# Patient Record
Sex: FEMALE | Race: WHITE | NOT HISPANIC OR LATINO | Employment: OTHER | URBAN - METROPOLITAN AREA
[De-identification: names, ages, dates, MRNs, and addresses within clinical notes are randomized per-mention and may not be internally consistent; named-entity substitution may affect disease eponyms.]

---

## 2023-01-01 ENCOUNTER — HOSPITAL ENCOUNTER (OUTPATIENT)
Dept: DATA CONVERSION | Facility: HOSPITAL | Age: 68
Discharge: HOME | End: 2023-08-08

## 2023-01-01 ENCOUNTER — TELEPHONE (OUTPATIENT)
Dept: PRIMARY CARE | Facility: CLINIC | Age: 68
End: 2023-01-01
Payer: MEDICARE

## 2023-01-01 ENCOUNTER — TELEPHONE (OUTPATIENT)
Dept: PAIN MEDICINE | Facility: CLINIC | Age: 68
End: 2023-01-01

## 2023-01-01 ENCOUNTER — OFFICE VISIT (OUTPATIENT)
Dept: PAIN MEDICINE | Facility: CLINIC | Age: 68
End: 2023-01-01
Payer: MEDICARE

## 2023-01-01 VITALS
OXYGEN SATURATION: 97 % | WEIGHT: 148 LBS | TEMPERATURE: 96.4 F | HEIGHT: 63 IN | SYSTOLIC BLOOD PRESSURE: 130 MMHG | BODY MASS INDEX: 26.22 KG/M2 | DIASTOLIC BLOOD PRESSURE: 72 MMHG | HEART RATE: 64 BPM | RESPIRATION RATE: 16 BRPM

## 2023-01-01 VITALS
BODY MASS INDEX: 22.66 KG/M2 | HEART RATE: 76 BPM | HEIGHT: 66 IN | SYSTOLIC BLOOD PRESSURE: 127 MMHG | RESPIRATION RATE: 16 BRPM | WEIGHT: 141 LBS | DIASTOLIC BLOOD PRESSURE: 78 MMHG

## 2023-01-01 DIAGNOSIS — G47.00 INSOMNIA, UNSPECIFIED TYPE: ICD-10-CM

## 2023-01-01 DIAGNOSIS — M47.812 CERVICAL SPONDYLOSIS: ICD-10-CM

## 2023-01-01 DIAGNOSIS — M79.7 FIBROMYALGIA: Primary | ICD-10-CM

## 2023-01-01 DIAGNOSIS — G47.00 INSOMNIA, UNSPECIFIED TYPE: Primary | ICD-10-CM

## 2023-01-01 DIAGNOSIS — K58.9 IRRITABLE BOWEL SYNDROME, UNSPECIFIED TYPE: Primary | ICD-10-CM

## 2023-01-01 DIAGNOSIS — D48.5 NEOPLASM OF UNCERTAIN BEHAVIOR OF SKIN: ICD-10-CM

## 2023-01-01 PROCEDURE — 1036F TOBACCO NON-USER: CPT | Performed by: ANESTHESIOLOGY

## 2023-01-01 PROCEDURE — 99214 OFFICE O/P EST MOD 30 MIN: CPT | Performed by: PHYSICAL MEDICINE & REHABILITATION

## 2023-01-01 PROCEDURE — 99204 OFFICE O/P NEW MOD 45 MIN: CPT | Performed by: ANESTHESIOLOGY

## 2023-01-01 PROCEDURE — 3078F DIAST BP <80 MM HG: CPT | Performed by: PHYSICAL MEDICINE & REHABILITATION

## 2023-01-01 PROCEDURE — 3078F DIAST BP <80 MM HG: CPT | Performed by: ANESTHESIOLOGY

## 2023-01-01 PROCEDURE — 1160F RVW MEDS BY RX/DR IN RCRD: CPT | Performed by: ANESTHESIOLOGY

## 2023-01-01 PROCEDURE — 1159F MED LIST DOCD IN RCRD: CPT | Performed by: ANESTHESIOLOGY

## 2023-01-01 PROCEDURE — 3074F SYST BP LT 130 MM HG: CPT | Performed by: PHYSICAL MEDICINE & REHABILITATION

## 2023-01-01 PROCEDURE — 1125F AMNT PAIN NOTED PAIN PRSNT: CPT | Performed by: ANESTHESIOLOGY

## 2023-01-01 PROCEDURE — 1125F AMNT PAIN NOTED PAIN PRSNT: CPT | Performed by: PHYSICAL MEDICINE & REHABILITATION

## 2023-01-01 PROCEDURE — 1160F RVW MEDS BY RX/DR IN RCRD: CPT | Performed by: PHYSICAL MEDICINE & REHABILITATION

## 2023-01-01 PROCEDURE — 99214 OFFICE O/P EST MOD 30 MIN: CPT | Performed by: ANESTHESIOLOGY

## 2023-01-01 PROCEDURE — 3075F SYST BP GE 130 - 139MM HG: CPT | Performed by: ANESTHESIOLOGY

## 2023-01-01 PROCEDURE — 1159F MED LIST DOCD IN RCRD: CPT | Performed by: PHYSICAL MEDICINE & REHABILITATION

## 2023-01-01 RX ORDER — AMOXICILLIN AND CLAVULANATE POTASSIUM 875; 125 MG/1; MG/1
875 TABLET, FILM COATED ORAL EVERY 12 HOURS
COMMUNITY
Start: 2020-03-11 | End: 2023-01-01 | Stop reason: ALTCHOICE

## 2023-01-01 RX ORDER — PERPHENAZINE 16 MG
TABLET ORAL
Qty: 120 CAPSULE | Refills: 11 | Status: SHIPPED | OUTPATIENT
Start: 2023-01-01 | End: 2023-12-05

## 2023-01-01 RX ORDER — DEXLANSOPRAZOLE 60 MG/1
60 CAPSULE, DELAYED RELEASE ORAL
COMMUNITY
Start: 2023-01-01

## 2023-01-01 RX ORDER — ZOLPIDEM TARTRATE 12.5 MG/1
12.5 TABLET, FILM COATED, EXTENDED RELEASE ORAL NIGHTLY PRN
Qty: 10 TABLET | Refills: 0 | Status: SHIPPED | OUTPATIENT
Start: 2023-01-01 | End: 2023-01-01 | Stop reason: SDUPTHER

## 2023-01-01 RX ORDER — ISOSORBIDE MONONITRATE 20 MG/1
TABLET ORAL
COMMUNITY
End: 2023-01-01 | Stop reason: ALTCHOICE

## 2023-01-01 RX ORDER — ATORVASTATIN CALCIUM 40 MG/1
40 TABLET, FILM COATED ORAL DAILY
COMMUNITY

## 2023-01-01 RX ORDER — PREDNISONE 10 MG/1
10 TABLET ORAL DAILY
COMMUNITY
Start: 2023-01-01 | End: 2023-01-01 | Stop reason: ALTCHOICE

## 2023-01-01 RX ORDER — ZOLPIDEM TARTRATE 5 MG/1
5 TABLET ORAL NIGHTLY PRN
COMMUNITY
End: 2023-01-01 | Stop reason: DRUGHIGH

## 2023-01-01 RX ORDER — PROMETHAZINE HYDROCHLORIDE 25 MG/1
25 TABLET ORAL EVERY 8 HOURS PRN
COMMUNITY

## 2023-01-01 RX ORDER — ZOLPIDEM TARTRATE 12.5 MG/1
12.5 TABLET, FILM COATED, EXTENDED RELEASE ORAL NIGHTLY PRN
Qty: 30 TABLET | Refills: 0 | Status: SHIPPED | OUTPATIENT
Start: 2023-01-01 | End: 2023-12-05

## 2023-01-01 RX ORDER — BUSPIRONE HYDROCHLORIDE 7.5 MG/1
7.5 TABLET ORAL 2 TIMES DAILY
COMMUNITY

## 2023-01-01 RX ORDER — DIPHENHYDRAMINE HCL 25 MG
25 TABLET ORAL NIGHTLY PRN
COMMUNITY

## 2023-01-01 RX ORDER — ACETAMINOPHEN 160 MG/5ML
200 SUSPENSION, ORAL (FINAL DOSE FORM) ORAL 3 TIMES DAILY
Qty: 90 CAPSULE | Refills: 11 | Status: SHIPPED | OUTPATIENT
Start: 2023-01-01 | End: 2023-12-05

## 2023-01-01 RX ORDER — VITAMIN B COMPLEX
1 CAPSULE ORAL 2 TIMES DAILY
Qty: 60 CAPSULE | Refills: 11 | Status: SHIPPED | OUTPATIENT
Start: 2023-01-01 | End: 2023-12-05

## 2023-01-01 RX ORDER — CALCIUM CARBONATE 300MG(750)
TABLET,CHEWABLE ORAL
COMMUNITY

## 2023-01-01 RX ORDER — HYDROXYZINE HYDROCHLORIDE 25 MG/1
4 TABLET, FILM COATED ORAL NIGHTLY
COMMUNITY
Start: 2018-03-21

## 2023-01-01 RX ORDER — PANTOPRAZOLE SODIUM 40 MG/1
40 TABLET, DELAYED RELEASE ORAL EVERY 12 HOURS
COMMUNITY

## 2023-01-01 RX ORDER — TIZANIDINE 2 MG/1
2 TABLET ORAL
COMMUNITY
Start: 2023-01-01 | End: 2023-01-01 | Stop reason: ALTCHOICE

## 2023-01-01 RX ORDER — ZOLPIDEM TARTRATE 12.5 MG/1
12.5 TABLET, FILM COATED, EXTENDED RELEASE ORAL EVERY 24 HOURS
COMMUNITY
Start: 2023-01-01 | End: 2023-01-01

## 2023-01-01 RX ORDER — LANOLIN ALCOHOL/MO/W.PET/CERES
400 CREAM (GRAM) TOPICAL 2 TIMES DAILY
COMMUNITY
End: 2023-01-01 | Stop reason: WASHOUT

## 2023-01-01 RX ORDER — FOLIC ACID 1 MG/1
1 TABLET ORAL DAILY
COMMUNITY

## 2023-01-01 RX ORDER — BUTALBITAL, ACETAMINOPHEN AND CAFFEINE 50; 325; 40 MG/1; MG/1; MG/1
1 CAPSULE ORAL EVERY 4 HOURS PRN
COMMUNITY
Start: 2023-01-01

## 2023-01-01 RX ORDER — TIZANIDINE 4 MG/1
4 TABLET ORAL EVERY 8 HOURS PRN
COMMUNITY

## 2023-01-01 RX ORDER — FAMOTIDINE 40 MG/1
TABLET, FILM COATED ORAL
COMMUNITY
End: 2023-01-01 | Stop reason: ALTCHOICE

## 2023-01-01 RX ORDER — LOPERAMIDE HYDROCHLORIDE 2 MG/1
2 CAPSULE ORAL
COMMUNITY
End: 2023-01-01 | Stop reason: WASHOUT

## 2023-01-01 RX ORDER — ZOLPIDEM TARTRATE 10 MG/1
10 TABLET ORAL
COMMUNITY
Start: 2022-01-01 | End: 2023-01-01 | Stop reason: DRUGHIGH

## 2023-01-01 RX ORDER — GABAPENTIN 600 MG/1
600 TABLET ORAL 3 TIMES DAILY
COMMUNITY

## 2023-01-01 RX ORDER — CIPROFLOXACIN 500 MG/1
500 TABLET ORAL EVERY 12 HOURS
COMMUNITY
Start: 2023-01-01 | End: 2023-01-01 | Stop reason: ALTCHOICE

## 2023-01-01 RX ORDER — MONTELUKAST SODIUM 4 MG/1
2 TABLET, CHEWABLE ORAL DAILY
COMMUNITY

## 2023-01-01 RX ORDER — TOPIRAMATE 25 MG/1
25 TABLET ORAL 2 TIMES DAILY
COMMUNITY

## 2023-01-01 RX ORDER — CHLORDIAZEPOXIDE HYDROCHLORIDE AND CLIDINIUM BROMIDE 5; 2.5 MG/1; MG/1
1 CAPSULE ORAL EVERY 12 HOURS
Qty: 60 CAPSULE | Refills: 0 | Status: SHIPPED | OUTPATIENT
Start: 2023-01-01 | End: 2023-12-05

## 2023-01-01 RX ORDER — AMLODIPINE BESYLATE 10 MG/1
1 TABLET ORAL DAILY
COMMUNITY

## 2023-01-01 RX ORDER — ALPRAZOLAM 0.5 MG/1
1 TABLET ORAL 3 TIMES DAILY
COMMUNITY
End: 2023-01-01 | Stop reason: ALTCHOICE

## 2023-01-01 RX ORDER — PREGABALIN 50 MG/1
1 CAPSULE ORAL EVERY 12 HOURS
COMMUNITY
Start: 2023-01-01

## 2023-01-01 RX ORDER — FENTANYL 50 UG/1
1 PATCH TRANSDERMAL
COMMUNITY
End: 2023-01-01 | Stop reason: ALTCHOICE

## 2023-01-01 RX ORDER — SCOLOPAMINE TRANSDERMAL SYSTEM 1 MG/1
1 PATCH, EXTENDED RELEASE TRANSDERMAL AS NEEDED
COMMUNITY
End: 2023-01-01 | Stop reason: ALTCHOICE

## 2023-01-01 RX ORDER — CARISOPRODOL 350 MG/1
350 TABLET ORAL 3 TIMES DAILY PRN
COMMUNITY
Start: 2023-01-01

## 2023-01-01 RX ORDER — TRAMADOL HYDROCHLORIDE 50 MG/1
50 TABLET ORAL
COMMUNITY
Start: 2022-01-01

## 2023-01-01 RX ORDER — METOPROLOL TARTRATE 50 MG/1
50 TABLET ORAL DAILY
COMMUNITY
Start: 2018-11-23 | End: 2023-01-01 | Stop reason: ALTCHOICE

## 2023-01-01 RX ORDER — METOCLOPRAMIDE 10 MG/1
10 TABLET ORAL 3 TIMES DAILY
COMMUNITY
Start: 2016-09-23 | End: 2023-01-01 | Stop reason: ALTCHOICE

## 2023-01-01 RX ORDER — SODIUM BICARBONATE 650 MG/1
650 TABLET ORAL 3 TIMES DAILY
COMMUNITY

## 2023-01-01 RX ORDER — LIDOCAINE HYDROCHLORIDE 20 MG/ML
SOLUTION ORAL; TOPICAL
COMMUNITY
Start: 2022-10-31 | End: 2023-01-01 | Stop reason: ALTCHOICE

## 2023-01-01 RX ORDER — METOPROLOL TARTRATE 100 MG/1
100 TABLET ORAL 2 TIMES DAILY
COMMUNITY

## 2023-01-01 RX ORDER — ISOSORBIDE MONONITRATE 60 MG/1
60 TABLET, EXTENDED RELEASE ORAL EVERY MORNING
COMMUNITY

## 2023-01-01 RX ORDER — LANOLIN ALCOHOL/MO/W.PET/CERES
100 CREAM (GRAM) TOPICAL DAILY
COMMUNITY

## 2023-01-01 RX ORDER — CARISOPRODOL 250 MG/1
250 TABLET ORAL 4 TIMES DAILY
COMMUNITY

## 2023-01-01 RX ORDER — ATENOLOL AND CHLORTHALIDONE TABLET 50; 25 MG/1; MG/1
1 TABLET ORAL EVERY 24 HOURS
COMMUNITY

## 2023-01-01 RX ORDER — OXYCODONE AND ACETAMINOPHEN 5; 325 MG/1; MG/1
TABLET ORAL
COMMUNITY
Start: 2015-12-15 | End: 2023-01-01 | Stop reason: ALTCHOICE

## 2023-01-01 RX ORDER — LANOLIN ALCOHOL/MO/W.PET/CERES
1 CREAM (GRAM) TOPICAL DAILY
COMMUNITY

## 2023-01-01 RX ORDER — OXYCODONE AND ACETAMINOPHEN 7.5; 325 MG/1; MG/1
1 TABLET ORAL EVERY 6 HOURS
COMMUNITY
End: 2023-01-01 | Stop reason: ALTCHOICE

## 2023-01-01 RX ORDER — CHLORDIAZEPOXIDE HYDROCHLORIDE AND CLIDINIUM BROMIDE 5; 2.5 MG/1; MG/1
1 CAPSULE ORAL EVERY 12 HOURS
COMMUNITY
Start: 2013-08-15 | End: 2023-01-01

## 2023-01-01 RX ORDER — PANCRELIPASE LIPASE, PANCRELIPASE PROTEASE, PANCRELIPASE AMYLASE 5000; 17000; 24000 [USP'U]/1; [USP'U]/1; [USP'U]/1
CAPSULE, DELAYED RELEASE ORAL
COMMUNITY

## 2023-01-01 RX ORDER — ZOLPIDEM TARTRATE 6.25 MG/1
6.25 TABLET, FILM COATED, EXTENDED RELEASE ORAL NIGHTLY
COMMUNITY

## 2023-01-01 ASSESSMENT — PATIENT HEALTH QUESTIONNAIRE - PHQ9
4. FEELING TIRED OR HAVING LITTLE ENERGY: NEARLY EVERY DAY
8. MOVING OR SPEAKING SO SLOWLY THAT OTHER PEOPLE COULD HAVE NOTICED. OR THE OPPOSITE, BEING SO FIGETY OR RESTLESS THAT YOU HAVE BEEN MOVING AROUND A LOT MORE THAN USUAL: NOT AT ALL
9. THOUGHTS THAT YOU WOULD BE BETTER OFF DEAD, OR OF HURTING YOURSELF: NOT AT ALL
3. TROUBLE FALLING OR STAYING ASLEEP OR SLEEPING TOO MUCH: NEARLY EVERY DAY
6. FEELING BAD ABOUT YOURSELF - OR THAT YOU ARE A FAILURE OR HAVE LET YOURSELF OR YOUR FAMILY DOWN: NOT AT ALL
1. LITTLE INTEREST OR PLEASURE IN DOING THINGS: SEVERAL DAYS
5. POOR APPETITE OR OVEREATING: NEARLY EVERY DAY
10. IF YOU CHECKED OFF ANY PROBLEMS, HOW DIFFICULT HAVE THESE PROBLEMS MADE IT FOR YOU TO DO YOUR WORK, TAKE CARE OF THINGS AT HOME, OR GET ALONG WITH OTHER PEOPLE: VERY DIFFICULT
2. FEELING DOWN, DEPRESSED OR HOPELESS: SEVERAL DAYS
SUM OF ALL RESPONSES TO PHQ9 QUESTIONS 1 AND 2: 2
7. TROUBLE CONCENTRATING ON THINGS, SUCH AS READING THE NEWSPAPER OR WATCHING TELEVISION: NOT AT ALL
SUM OF ALL RESPONSES TO PHQ QUESTIONS 1-9: 11

## 2023-01-01 ASSESSMENT — ENCOUNTER SYMPTOMS
DEPRESSION: 1
NECK PAIN: 1
EYES NEGATIVE: 1
HEMATOLOGIC/LYMPHATIC NEGATIVE: 1
MYALGIAS: 1
PSYCHIATRIC NEGATIVE: 1
RESPIRATORY NEGATIVE: 1
LOSS OF SENSATION IN FEET: 1
GASTROINTESTINAL NEGATIVE: 1
ENDOCRINE NEGATIVE: 1
OCCASIONAL FEELINGS OF UNSTEADINESS: 1
NEUROLOGICAL NEGATIVE: 1
ARTHRALGIAS: 1
CARDIOVASCULAR NEGATIVE: 1

## 2023-01-01 ASSESSMENT — PAIN SCALES - GENERAL
PAINLEVEL_OUTOF10: 8
PAINLEVEL: 8
PAINLEVEL_OUTOF10: 8

## 2023-01-01 ASSESSMENT — COLUMBIA-SUICIDE SEVERITY RATING SCALE - C-SSRS
2. HAVE YOU ACTUALLY HAD ANY THOUGHTS OF KILLING YOURSELF?: NO
6. HAVE YOU EVER DONE ANYTHING, STARTED TO DO ANYTHING, OR PREPARED TO DO ANYTHING TO END YOUR LIFE?: NO
1. IN THE PAST MONTH, HAVE YOU WISHED YOU WERE DEAD OR WISHED YOU COULD GO TO SLEEP AND NOT WAKE UP?: NO

## 2023-01-01 ASSESSMENT — PAIN DESCRIPTION - DESCRIPTORS
DESCRIPTORS: ACHING;TENDER;SPASM
DESCRIPTORS: ACHING;STABBING

## 2023-01-01 ASSESSMENT — LIFESTYLE VARIABLES: TOTAL SCORE: 5

## 2023-01-01 ASSESSMENT — PAIN - FUNCTIONAL ASSESSMENT
PAIN_FUNCTIONAL_ASSESSMENT: 0-10
PAIN_FUNCTIONAL_ASSESSMENT: 0-10

## 2023-10-01 PROBLEM — I12.9 CHRONIC KIDNEY DISEASE DUE TO BENIGN HYPERTENSION: Status: ACTIVE | Noted: 2023-01-01

## 2023-10-01 PROBLEM — K21.9 GASTROESOPHAGEAL REFLUX DISEASE: Status: ACTIVE | Noted: 2023-01-01

## 2023-10-01 PROBLEM — S72.001S: Status: ACTIVE | Noted: 2023-01-01

## 2023-10-01 PROBLEM — E51.9 VITAMIN B1 DEFICIENCY: Status: ACTIVE | Noted: 2023-01-01

## 2023-10-01 PROBLEM — I49.8 OTHER SPECIFIED CARDIAC ARRHYTHMIAS: Status: ACTIVE | Noted: 2023-01-01

## 2023-10-01 PROBLEM — E55.9 VITAMIN D DEFICIENCY: Status: ACTIVE | Noted: 2023-01-01

## 2023-10-01 PROBLEM — D69.6 THROMBOCYTOPENIC DISORDER (CMS-HCC): Status: ACTIVE | Noted: 2023-01-01

## 2023-10-01 PROBLEM — M95.2 SKULL DEFECT: Status: ACTIVE | Noted: 2023-01-01

## 2023-10-01 PROBLEM — K58.9 IRRITABLE BOWEL SYNDROME: Status: ACTIVE | Noted: 2023-01-01

## 2023-10-01 PROBLEM — Z79.891 LONG TERM (CURRENT) USE OF OPIATE ANALGESIC: Status: ACTIVE | Noted: 2023-01-01

## 2023-10-01 PROBLEM — K27.9 PEPTIC ULCER DISEASE: Status: ACTIVE | Noted: 2023-01-01

## 2023-10-01 PROBLEM — R06.02 SHORTNESS OF BREATH: Status: ACTIVE | Noted: 2023-01-01

## 2023-10-01 PROBLEM — R74.8 ABNORMAL LIVER ENZYMES: Status: ACTIVE | Noted: 2023-01-01

## 2023-10-01 PROBLEM — T40.2X5A THERAPEUTIC OPIOID INDUCED CONSTIPATION: Status: ACTIVE | Noted: 2023-01-01

## 2023-10-01 PROBLEM — R60.0 LOWER EXTREMITY EDEMA: Status: ACTIVE | Noted: 2023-01-01

## 2023-10-01 PROBLEM — M79.606 PAIN AND SWELLING OF LOWER EXTREMITY: Status: ACTIVE | Noted: 2023-01-01

## 2023-10-01 PROBLEM — G89.29 OTHER CHRONIC PAIN: Status: ACTIVE | Noted: 2023-01-01

## 2023-10-01 PROBLEM — R53.83 FATIGUE: Status: ACTIVE | Noted: 2023-01-01

## 2023-10-01 PROBLEM — H90.3 ASYMMETRIC SNHL (SENSORINEURAL HEARING LOSS): Status: ACTIVE | Noted: 2023-01-01

## 2023-10-01 PROBLEM — G45.9 TRANSIENT ISCHEMIC ATTACK: Status: ACTIVE | Noted: 2023-01-01

## 2023-10-01 PROBLEM — M47.812 CERVICAL SPONDYLOSIS: Status: ACTIVE | Noted: 2023-01-01

## 2023-10-01 PROBLEM — K86.1 CHRONIC PANCREATITIS (MULTI): Status: ACTIVE | Noted: 2023-01-01

## 2023-10-01 PROBLEM — H92.01 OTALGIA OF RIGHT EAR: Status: ACTIVE | Noted: 2023-01-01

## 2023-10-01 PROBLEM — S09.90XA HEAD INJURY: Status: ACTIVE | Noted: 2023-01-01

## 2023-10-01 PROBLEM — Z96.659 ARTIFICIAL KNEE JOINT PRESENT: Status: ACTIVE | Noted: 2023-01-01

## 2023-10-01 PROBLEM — M17.11 ARTHRITIS OF KNEE, RIGHT: Status: ACTIVE | Noted: 2023-01-01

## 2023-10-01 PROBLEM — K59.03 THERAPEUTIC OPIOID INDUCED CONSTIPATION: Status: ACTIVE | Noted: 2023-01-01

## 2023-10-01 PROBLEM — I47.10 SVT (SUPRAVENTRICULAR TACHYCARDIA) (CMS-HCC): Status: ACTIVE | Noted: 2023-01-01

## 2023-10-01 PROBLEM — J44.9 CHRONIC OBSTRUCTIVE PULMONARY DISEASE (MULTI): Status: ACTIVE | Noted: 2023-01-01

## 2023-10-01 PROBLEM — G99.0 AUTONOMIC NEUROPATHY IN DISEASES CLASSIFIED ELSEWHERE: Status: ACTIVE | Noted: 2023-01-01

## 2023-10-01 PROBLEM — J32.9 RECURRENT SINUSITIS: Status: ACTIVE | Noted: 2023-01-01

## 2023-10-01 PROBLEM — R27.0 ATAXIA: Status: ACTIVE | Noted: 2023-01-01

## 2023-10-01 PROBLEM — M54.16 LUMBAR RADICULITIS: Status: ACTIVE | Noted: 2023-01-01

## 2023-10-01 PROBLEM — F41.8 MIXED ANXIETY AND DEPRESSIVE DISORDER: Status: ACTIVE | Noted: 2023-01-01

## 2023-10-01 PROBLEM — J32.9 CHRONIC SINUSITIS: Status: ACTIVE | Noted: 2023-01-01

## 2023-10-01 PROBLEM — G47.34 IDIOPATHIC SLEEP RELATED NONOBSTRUCTIVE ALVEOLAR HYPOVENTILATION: Status: ACTIVE | Noted: 2023-01-01

## 2023-10-01 PROBLEM — K31.84 GASTROPARESIS: Status: ACTIVE | Noted: 2023-01-01

## 2023-10-01 PROBLEM — R68.2 MOUTH DRYNESS: Status: ACTIVE | Noted: 2023-01-01

## 2023-10-01 PROBLEM — F33.2 MAJOR DEPRESSIVE DISORDER, RECURRENT SEVERE WITHOUT PSYCHOTIC FEATURES (MULTI): Status: ACTIVE | Noted: 2023-01-01

## 2023-10-01 PROBLEM — F10.90 CHRONIC ALCOHOL USE: Status: ACTIVE | Noted: 2023-01-01

## 2023-10-01 PROBLEM — G47.00 INSOMNIA: Status: ACTIVE | Noted: 2023-01-01

## 2023-10-01 PROBLEM — E87.6 HYPOKALEMIA: Status: ACTIVE | Noted: 2023-01-01

## 2023-10-01 PROBLEM — N18.9 CHRONIC RENAL FAILURE: Status: ACTIVE | Noted: 2023-01-01

## 2023-10-01 PROBLEM — M47.817 LUMBOSACRAL SPONDYLOSIS WITH COMPLICATION: Status: ACTIVE | Noted: 2023-01-01

## 2023-10-01 PROBLEM — M48.43XA: Status: ACTIVE | Noted: 2023-01-01

## 2023-10-01 PROBLEM — R06.00 DYSPNEA: Status: ACTIVE | Noted: 2023-01-01

## 2023-10-01 PROBLEM — K86.2 PANCREATIC CYST (HHS-HCC): Status: ACTIVE | Noted: 2023-01-01

## 2023-10-01 PROBLEM — F41.9 ANXIETY DISORDER: Status: ACTIVE | Noted: 2023-01-01

## 2023-10-01 PROBLEM — I95.1 ORTHOSTATIC HYPOTENSION: Status: ACTIVE | Noted: 2023-01-01

## 2023-10-01 PROBLEM — Z04.9 CONDITION NOT FOUND: Status: ACTIVE | Noted: 2023-01-01

## 2023-10-01 PROBLEM — G89.4 CHRONIC PAIN SYNDROME: Status: ACTIVE | Noted: 2023-01-01

## 2023-10-01 PROBLEM — M35.3 POLYMYALGIA RHEUMATICA (MULTI): Status: ACTIVE | Noted: 2023-01-01

## 2023-10-01 PROBLEM — K92.1 HEMATOCHEZIA: Status: ACTIVE | Noted: 2023-01-01

## 2023-10-01 PROBLEM — Q75.8: Status: ACTIVE | Noted: 2023-01-01

## 2023-10-01 PROBLEM — R00.0 TACHYCARDIA: Status: ACTIVE | Noted: 2023-01-01

## 2023-10-01 PROBLEM — G50.0 TRIGEMINAL NEURALGIA OF RIGHT SIDE OF FACE: Status: ACTIVE | Noted: 2023-01-01

## 2023-10-01 PROBLEM — M17.12 PRIMARY OSTEOARTHRITIS OF LEFT KNEE: Status: ACTIVE | Noted: 2023-01-01

## 2023-10-01 PROBLEM — M79.89 PAIN AND SWELLING OF LOWER EXTREMITY: Status: ACTIVE | Noted: 2023-01-01

## 2023-10-01 PROBLEM — T81.9XXA COMPLICATION OF SURGICAL PROCEDURE: Status: ACTIVE | Noted: 2023-01-01

## 2023-10-01 PROBLEM — M79.18 MYOFASCIAL PAIN: Status: ACTIVE | Noted: 2023-01-01

## 2023-10-01 PROBLEM — R42 DIZZINESS: Status: ACTIVE | Noted: 2023-01-01

## 2023-10-01 PROBLEM — N95.1 MENOPAUSAL STATE: Status: ACTIVE | Noted: 2023-01-01

## 2023-10-01 PROBLEM — R51.9 GENERALIZED HEADACHE: Status: ACTIVE | Noted: 2023-01-01

## 2023-10-01 PROBLEM — I10 ESSENTIAL HYPERTENSION: Status: ACTIVE | Noted: 2023-01-01

## 2023-10-01 PROBLEM — H93.19 TINNITUS: Status: ACTIVE | Noted: 2023-01-01

## 2023-10-01 PROBLEM — E16.2 HYPOGLYCEMIA: Status: ACTIVE | Noted: 2023-01-01

## 2023-10-01 PROBLEM — J30.9 ALLERGIC RHINITIS: Status: ACTIVE | Noted: 2023-01-01

## 2023-10-01 PROBLEM — I20.9 ANGINA PECTORIS (CMS-HCC): Status: ACTIVE | Noted: 2023-01-01

## 2023-10-01 PROBLEM — Z79.899 POLYPHARMACY: Status: ACTIVE | Noted: 2023-01-01

## 2023-10-01 PROBLEM — M79.7 FIBROMYALGIA: Status: ACTIVE | Noted: 2023-01-01

## 2023-10-01 PROBLEM — E78.5 HYPERLIPIDEMIA: Status: ACTIVE | Noted: 2023-01-01

## 2023-10-01 PROBLEM — G43.909 MIGRAINE HEADACHE: Status: ACTIVE | Noted: 2023-01-01

## 2023-10-03 NOTE — PROGRESS NOTES
Subjective   Patient ID: Evonne Kim is a 67 y.o. female who presents for Follow-up, Neck Pain, and Back Pain.  HPI    67-year-old female with a past medical history of chronic alcohol use, CKD, hypertension, COPD, chronic pancreatitis, autonomic neuropathy, GERD, gastroparesis fibromyalgia, polymyalgia rheumatica, migraines, peptic ulcers, orthostatic hypotension, SVT, thrombocytopenia, who is seen in clinic today as follow-up for chronic neck back and really widespread pain.  Since last being seen I started her on gabapentin and she will titrate up to 3 times a day but felt that it was not helping so she stopped it though she is reporting her pain is worse today.  She has tried multiple medications to include antidepressants which caused her to have suicidal ideations, Topamax which did not help as well as opiates which did not help.  She is currently on Soma which helps minimally, has previously been on tizanidine.  Since last being seen she states she is not able to exercise for fibromyalgia because she is in too much pain.  Patient does have neck pain and has a history of fusion and her pain is primarily axial neck and back that she does have some throbbing in her right upper extremity.  She reports generalized weakness but otherwise no specific weakness.        Review of Systems   13 point review systems done and negative except as in HPI    Current Outpatient Medications:     amLODIPine (Norvasc) 10 mg tablet, Take 1 tablet (10 mg) by mouth once daily., Disp: , Rfl:     atenoloL-chlorthalidone (Tenoretic 50) 50-25 mg tablet, Take 1 tablet by mouth once every 24 hours., Disp: , Rfl:     atorvastatin (Lipitor) 40 mg tablet, Take 1 tablet (40 mg) by mouth once daily., Disp: , Rfl:     busPIRone (Buspar) 7.5 mg tablet, Take 1 tablet (7.5 mg) by mouth 2 times a day., Disp: , Rfl:     butalbital-acetaminophen-caff (Esgic) -40 mg capsule, Take 1 capsule by mouth every 4 hours if needed., Disp: , Rfl:      carisoprodol (Soma) 350 mg tablet, Take 1 tablet (350 mg) by mouth 3 times a day as needed., Disp: , Rfl:     colestipol (Colestid) 1 gram tablet, Take 2 tablets (2 g) by mouth once daily., Disp: , Rfl:     cyanocobalamin (Vitamin B-12) 1,000 mcg tablet, Take 1 tablet (1,000 mcg) by mouth once daily., Disp: , Rfl:     dexlansoprazole (Dexilant) 60 mg DR capsule, Take 1 capsule (60 mg) by mouth., Disp: , Rfl:     folic acid (Folvite) 1 mg tablet, Take 1 tablet (1 mg) by mouth once daily., Disp: , Rfl:     hydrOXYzine HCL (Atarax) 25 mg tablet, Take 4 tablets (100 mg) by mouth once daily at bedtime., Disp: , Rfl:     isosorbide mononitrate ER (Imdur) 60 mg 24 hr tablet, Take 1 tablet (60 mg) by mouth once daily in the morning., Disp: , Rfl:     Librax, with clidinium, 5-2.5 mg capsule, Take 1 capsule by mouth every 12 hours., Disp: , Rfl:     magnesium oxide (Mag-Ox) 400 mg (241.3 mg magnesium) tablet, Take 1 tablet (400 mg) by mouth 2 times a day., Disp: , Rfl:     metoprolol tartrate (Lopressor) 100 mg tablet, Take 1 tablet (100 mg) by mouth 2 times a day., Disp: , Rfl:     pantoprazole (ProtoNix) 40 mg EC tablet, Take 1 tablet (40 mg) by mouth every 12 hours., Disp: , Rfl:     sodium bicarbonate 650 mg tablet, Take 1 tablet (650 mg) by mouth 3 times a day., Disp: , Rfl:     zolpidem CR (Ambien CR) 12.5 mg ER tablet, Take 1 tablet (12.5 mg) by mouth once every 24 hours., Disp: , Rfl:     ALPRAZolam (Xanax) 0.5 mg tablet, Take 1 tablet (0.5 mg) by mouth 3 times a day., Disp: , Rfl:     amoxicillin-pot clavulanate (Augmentin) 875-125 mg tablet, Take 1 tablet (875 mg) by mouth every 12 hours., Disp: , Rfl:     carisoprodol (Soma) 250 mg tablet, Take 1 tablet (250 mg) by mouth 4 times a day., Disp: , Rfl:     ciprofloxacin (Cipro) 500 mg tablet, Take 1 tablet (500 mg) by mouth every 12 hours., Disp: , Rfl:     famotidine (Pepcid) 40 mg tablet, Take by mouth., Disp: , Rfl:     fentaNYL (Duragesic) 50 mcg/hr patch, Place  1 patch on the skin., Disp: , Rfl:     gabapentin (Neurontin) 600 mg tablet, Take 1 tablet (600 mg) by mouth 3 times a day., Disp: , Rfl:     isosorbide mononitrate 20 mg tablet, Take by mouth., Disp: , Rfl:     Lidocaine Viscous 2 % solution, Take by mouth., Disp: , Rfl:     lipase-protease-amylase (Zenpep) 5,000-17,000- 24,000 unit capsule, Take by mouth., Disp: , Rfl:     loperamide (Imodium) 2 mg capsule, Take 1 capsule (2 mg) by mouth. 2 tablets in AM 1 tablet after episode, Disp: , Rfl:     metoclopramide (Reglan) 10 mg tablet, Take 1 tablet (10 mg) by mouth 3 times a day., Disp: , Rfl:     metoprolol tartrate (Lopressor) 50 mg tablet, Take 1 tablet by mouth once daily., Disp: , Rfl:     oxyCODONE-acetaminophen (Percocet) 5-325 mg tablet, Take by mouth., Disp: , Rfl:     oxyCODONE-acetaminophen (Percocet) 7.5-325 mg tablet, Take 1 tablet by mouth every 6 hours., Disp: , Rfl:     predniSONE (Deltasone) 10 mg tablet, Take 1 tablet (10 mg) by mouth once daily., Disp: , Rfl:     promethazine (Phenergan) 25 mg tablet, Take 1 tablet (25 mg) by mouth every 8 hours if needed., Disp: , Rfl:     scopolamine (Transderm-Scop) 1 mg over 3 days patch 3 day, 1 patch if needed., Disp: , Rfl:     thiamine 100 mg tablet, Take 1 tablet (100 mg) by mouth once daily., Disp: , Rfl:     tiZANidine (Zanaflex) 2 mg tablet, Take 1 tablet (2 mg) by mouth., Disp: , Rfl:     tiZANidine (Zanaflex) 4 mg tablet, Take 1 tablet (4 mg) by mouth every 8 hours if needed., Disp: , Rfl:     topiramate (Topamax) 25 mg tablet, Take 1 tablet (25 mg) by mouth 2 times a day., Disp: , Rfl:     traMADol (Ultram) 50 mg tablet, Take 1 tablet (50 mg) by mouth., Disp: , Rfl:     zolpidem (Ambien) 10 mg tablet, Take 1 tablet (10 mg) by mouth., Disp: , Rfl:     zolpidem (Ambien) 5 mg tablet, Take 1 tablet (5 mg) by mouth as needed at bedtime for sleep., Disp: , Rfl:     zolpidem CR (Ambien CR) 6.25 mg ER tablet, Take 1 tablet (6.25 mg) by mouth once daily at  bedtime., Disp: , Rfl:      Past Medical History:   Diagnosis Date    Acute maxillary sinusitis, unspecified 06/04/2019    Acute maxillary sinusitis    Acute maxillary sinusitis, unspecified 12/19/2017    Acute maxillary sinusitis    Acute pancreatitis without necrosis or infection, unspecified 10/12/2014    Acute recurrent pancreatitis    Diabetes mellitus due to underlying condition with hyperglycemia (CMS/[facility]) 02/09/2017    Diabetes mellitus due to underlying condition with hyperglycemia, unspecified long term insulin use status    Encounter for other preprocedural examination 08/03/2017    Preoperative clearance    Jaw pain 02/14/2020    Jaw pain, non-TMJ    Orthostatic hypotension 10/12/2014    Orthostatic hypotension    Other conditions influencing health status 10/22/2018    History of cough    Other conditions influencing health status 12/27/2016    History of cough    Personal history of malignant neoplasm of other organs and systems     History of squamous cell carcinoma    Rash and other nonspecific skin eruption 06/01/2014    Rash        Past Surgical History:   Procedure Laterality Date    APPENDECTOMY  10/12/2014    Appendectomy    CERVICAL FUSION  10/12/2014    Cervical Vertebral Fusion    CHOLECYSTECTOMY  10/12/2014    Cholecystectomy    KNEE ARTHROSCOPY W/ DEBRIDEMENT  10/07/2015    Knee Arthroscopy (Therapeutic)    MR HEAD ANGIO WO IV CONTRAST  9/28/2015    MR HEAD ANGIO WO IV CONTRAST [facility] INPATIENT LEGACY    OTHER SURGICAL HISTORY  10/07/2015    Abdominal Surgery    TOTAL KNEE ARTHROPLASTY  12/19/2017    Total Knee Replacement Right        Family History   Problem Relation Name Age of Onset    Emphysema Mother      COPD Mother      Other (ischemic heart disease) Father      Other (malignant melanoma) Brother      Other (ischemic heart disease) Other          Allergies   Allergen Reactions    Aspirin Other     GI ulcers    Iron Hives    Nsaids (Non-Steroidal Anti-Inflammatory Drug) Other     Serotonin Unknown     Suicidal ideation    Tetracyclic Antidepressants Other    Tetracyclines GI Upset    Tricyclic Antidepressants And Tricyclic Compounds Unknown     Suicidal ideations          Objective     Vitals:    10/03/23 1446   BP: 127/78   Pulse: 76   Resp: 16        Physical Exam    GENERAL EXAM  Vital Signs: Vital signs to include heart rate, respiration rate, blood pressure, and temperature were reviewed.  General Appearance:  Awake, alert, healthy appearing, well developed, No acute distress.  Head: Normocephalic without evidence of head injury.  Neck: The appearance of the neck was normal without swelling with a midline trachea.  Eyes: The eyelids and eyebrows exhibited no abnormalities.  Pupils were not pin-point.  Sclera was without icterus.  Lungs: Respiration rhythm and depth was normal.  Respiratory movements were normal without labored breathing.  Cardiovascular: No peripheral edema was present.    Neurological: Patient was oriented to time, place, and person.  Speech was normal.  Balance, gait, and stance were unremarkable.    Psychiatric: Appearance was normal with appropriate dress.  Mood was euthymic and affect was normal.  Skin: Affected regions were without ecchymosis or skin lesions.        Physical exam as above except:        Assessment/Plan   Problem List Items Addressed This Visit             ICD-10-CM    Fibromyalgia - Primary M79.7     67-year-old female with chronic widespread pain as well as axial neck pain and some headaches.  Patient has a history of fusion cervically with some adjacent segment disease based on her MRI which I personally reviewed at C2-3 and C3-4 as well as C6-7.  She is not really having much in terms of neurologic symptoms though.  I do think that while she certainly may have some cervical spondylosis component to her pain at the adjacent segments given her chronic widespread pain I think more of her symptoms are due to fibromyalgia.  She is not really doing  any exercises we have discussed because she is in too much pain.  Unfortunately I have really tried about every medication for fibromyalgia/musculoskeletal/neuropathic pain and would not recommend opiates for this type of noncancer musculoskeletal pain especially in the setting of Soma use, medical marijuana and benzodiazepine use.  It is reasonable for her to be seen by one of her surgeons which she states she is going to be soon.  We discussed really what I can do is refer her over to colleagues in [place] for evaluation for ketamine infusions as I think she could really benefit from this for my fibromyalgia and neuropathic pain standpoint.    Ultimately though I do not think there is any medications that is going to help her as much as low impact aerobic exercise.  The patient was explained that the mainstay of managing fibromyalgia/myofascial pain is to participate in low-impact aerobic exercising for 1 hour day, 6 days a week. Examples of low impact aerobic exercising include swimming, riding a stationary bicycle or exercising on an elliptical machine. Low-impact aerobic exercising at that rate results in much better pain relief than any of the medications that could be prescribed for fibromyalgia/myofascial pain and without side effects. The patient must be compliant, however, and must participate in such therapy for 8 weeks consecutively before noticing a remarkable response. It was explained to the patient that one could titrate up from a smaller duration of exercising by increasing the time spent doing the low-impact aerobic exercise in small increments such as 1 minute every day until meeting the 60 minutes a day, 6 days a week goal. The patient was receptive to the counseling and agrees to continue to exercise toward this goal.         Cervical spondylosis M47.819

## 2023-11-03 NOTE — PROGRESS NOTES
SUBJECTIVE:  This is 67 y.o.  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  The patient stated that she had a fall 30 years ago from her horse that broke her neck and up with the fusion and since that time continue to have pain.  No paralysis no incontinence no saddle paresthesias no balance issues  Medication that has been tried or been use currently: Butalbital acetaminophen and caffeine capsules, zolpidem 12-1/2 mg, different doses of prednisone, tizanidine, Soma, few tramadol, alprazolam, Norvasc, Duragesic patch, Butrans patch      Procedures:  None      Portions of record reviewed for pertinent issues: active problem list, medication list, allergies, family history, social history, notes from last encounter, encounters, lab results, imaging and other available records.      I have personally reviewed the OARRS report for this patient. This report is scanned into the electronic medical record. I have considered the risks of abuse, dependence, addiction and diversion. It showed: Current medical marijuana, pregabalin 50 mg, gabapentin 600 mg in the past  OPIOID RISK ASSESSMENT SCORE 5/26  Aberrant behavior: None        There were no vitals taken for this visit.   Review of Systems   HENT: Negative.     Eyes: Negative.    Respiratory: Negative.     Cardiovascular: Negative.    Gastrointestinal: Negative.    Endocrine: Negative.    Genitourinary: Negative.    Musculoskeletal:  Positive for  arthralgias, myalgias and neck pain.   Skin: Negative.    Neurological: Negative.    Hematological: Negative.    Psychiatric/Behavioral: Negative.        Physical Exam  Vitals and nursing note reviewed.   Constitutional:       Appearance: Normal appearance.   HENT:      Head: Normocephalic and atraumatic.      Nose: Nose normal.   Eyes:      Extraocular Movements: Extraocular movements intact.      Conjunctiva/sclera: Conjunctivae normal.      Pupils: Pupils are equal, round, and reactive to light.   Cardiovascular:      Rate and Rhythm: Normal rate and regular rhythm.      Pulses: Normal pulses.      Heart sounds: Normal heart sounds.   Pulmonary:      Effort: Pulmonary effort is normal.      Breath sounds: Normal breath sounds.   Abdominal:      General: Abdomen is flat. Bowel sounds are normal.      Palpations: Abdomen is soft.   Musculoskeletal:         General: Tenderness present.   Skin:     General: Skin is warm.   Neurological:      General: No focal deficit present.      Mental Status: She is alert and oriented to person, place, and time.   Psychiatric:         Mood and Affect: Mood normal.         Behavior: Behavior normal.             Diagnostic studies:  5/8/2023 EEG did not show any abnormal activities  4/26/2023 MRI of the right shoulder showed no nondisplaced fracture of the greater tuberosity of the humerus with intact rotator cuff  4/24/2023 MRI of the brain most likely showed microvascular disease but no demyelination or abnormal findings   4/24/2023 MRI of the cervical spine showed C4-7 fusion with minor disease above and below  4/24/2023 MRI of the lumbar spine report showed multiple degenerative changes disc bulges but no canal or foraminal stenosis    Plan  At least 50% of the visit was involved in the discussion of the options for treatment. We discussed exercises, medication, interventional therapies and surgery. Healthy life style is essential with patient hard work to achieve the wellness.  In addition; discussion with the patient and/or family about any of the diagnostic results, impressions and/or recommended diagnostic studies, prognosis, risks and benefits of treatment options, instructions for treatment and/or follow-up, importance of compliance with chosen treatment options, risk-factor reduction, and patient/family education.         Pool therapy, walking in the pool, at least 3x per week for 30 minutes  Continue self-directed physical therapy  Neuro supplement ordered  Referral to [facility]  Referral to aquatic therapy  Referral to behavioral health  Neuro supplement ordered  IV infusion therapy once every 2 weeks  Healthy lifestyle and anti-inflammatory diet in addition to weight control discussed with the patient  Alternative chronic pain therapies was discussed, encouraged and information was handed  Return to Clinic 3 months     *Please note this report has been produced using speech recognition software and may contain errors related to that system including grammar, punctuation and spelling as well as words and phrases that may be inappropriate. If there are questions or concerns, please feel free to contact me to clarify.    Burak Gautam MD

## 2023-11-03 NOTE — PROGRESS NOTES
Chief Complaint   Patient presents with    Fibromyalgia     History of Present Complaint:  The patient was referred to us by Referring Provider: Dwaine Whitehead . this is 67 y.o.  female  Accompanied by  with a past history of  Hypertension, SVT hyperlipidemia thrombocytopenia disorder deficiency vitamin D deficiency chronic pancreatitis gastric reflux disease gastroparesis irritable bowel syndrome peptic ulcer anxiety disorder chronic alcohol use which she denies , major depression disorder , chronic pain syndrome.   presenting with Fibromyalgia    Pain started 3-4 years    Pain is Worse anything that she does      The pain is described as achiness and stabbing and is relieved by nothing .Patient does reports butrans  did help her pain      Prior Pain Therapies:  fententl, butrans   Past surgical history:   right hip, right knee , neck surgery ,    Work Hx/litigation: retired OR nurse   Social history: , Has 1children, 2grandchildren and 0great-grandchildren, Finished high school, and Finished college major in nursing     Diagnostic studies: MRI studies, CT scan films, xrays     Opioid Risk Assessment Score 5/26

## 2023-11-29 NOTE — TELEPHONE ENCOUNTER
LV 9/15/23, [place] 1/19/24.  Request for zolpidem 12.5 mg to [address], [place]    Last rf: 11/20/23 by SARAH but only for 10 tabs.

## 2023-12-08 ENCOUNTER — APPOINTMENT (OUTPATIENT)
Dept: INFUSION THERAPY | Facility: CLINIC | Age: 68
End: 2023-12-08
Payer: MEDICARE

## 2023-12-22 ENCOUNTER — APPOINTMENT (OUTPATIENT)
Dept: INFUSION THERAPY | Facility: CLINIC | Age: 68
End: 2023-12-22
Payer: MEDICARE
